# Patient Record
Sex: MALE | Race: WHITE
[De-identification: names, ages, dates, MRNs, and addresses within clinical notes are randomized per-mention and may not be internally consistent; named-entity substitution may affect disease eponyms.]

---

## 2018-01-16 NOTE — OP
DATE OF PROCEDURE:  01/16/2018

 

PREOPERATIVE DIAGNOSIS:  Benign prostatic hypertrophy with bladder stones.

 

POSTOPERATIVE DIAGNOSIS:  Benign prostatic hypertrophy with bladder stones.

 

PROCEDURE PERFORMED:  Cystolitholapaxy.

 

SURGEON:  Dr. Davenport.

 

ANESTHESIA:  General with LMA.

 

FINDINGS:  Adequate fragmentation of 2 bladder stones with all of the fragments 
evacuated.  A total of 2250 joules used.

 

COMPLICATIONS:  No complications.  

 

SPECIMEN:  Bladder Stone.

 

DRAINS:  Drain remaining was a 20-Slovak 2-way.

 

ESTIMATED BLOOD LOSS:  Minimal.

 

INDICATIONS:  A 57-year-old male who presented with gross hematuria and the 
workup revealed bladder stones as well as significant BPH.  Based on the size 
of his gland and the significant hematuria associated with the stones, I opted 
to do cystolitholapaxy with delayed GreenLight.  He presents for the first 
procedure.

 

TECHNIQUE:  The patient was brought to the room by Anesthesia and kept in 
supine position.  After receiving general anesthetic, his legs were placed in 
lithotomy position and his perineum was prepped and draped in sterile fashion.  
Using a 26 resectoscope, urethra was traversed and the bladder inspected.  
Hematuria was noted before doing any procedure, both around the cystoscope and 
in the bladder itself.  The stones were identified and then fragmented into 
pieces that were small enough to extract.  At one point, they had situated 
themselves in a small diverticulum, but I ensured to irrigate this out fully 
and carefully inspected the floor of the bladder, which meant removing the 
large obstructing middle lobe laterally and medially in order to determine all 
fragments were removed.  Once I was satisfied of this, I checked the 
diverticulum again and nothing was there.  So at this point, the resectoscope 
was removed and a 20 Slovak Eason was placed to gravity.  There was no 
significant hematuria around the catheter itself and I expect there to be 
significant hematuria over the next day or so.  The patient tolerated the 
procedure well and was then awakened and transferred to the PACU in stable 
condition.

 

EVA

## 2018-02-19 ENCOUNTER — HOSPITAL ENCOUNTER (OUTPATIENT)
Dept: HOSPITAL 92 - LABBT | Age: 58
Discharge: HOME | End: 2018-02-19
Attending: UROLOGY
Payer: COMMERCIAL

## 2018-02-19 DIAGNOSIS — N40.0: ICD-10-CM

## 2018-02-19 DIAGNOSIS — Z01.812: Primary | ICD-10-CM

## 2018-02-19 LAB
ANION GAP SERPL CALC-SCNC: 10 MMOL/L (ref 10–20)
BUN SERPL-MCNC: 21 MG/DL (ref 8.4–25.7)
CALCIUM SERPL-MCNC: 9.7 MG/DL (ref 7.8–10.44)
CHLORIDE SERPL-SCNC: 107 MMOL/L (ref 98–107)
CO2 SERPL-SCNC: 26 MMOL/L (ref 22–29)
CREAT CL PREDICTED SERPL C-G-VRATE: 0 ML/MIN (ref 70–130)
GLUCOSE SERPL-MCNC: 92 MG/DL (ref 70–105)
HGB BLD-MCNC: 14.5 G/DL (ref 14–18)
MCH RBC QN AUTO: 29.7 PG (ref 27–31)
MCV RBC AUTO: 90.8 FL (ref 80–94)
PLATELET # BLD AUTO: 254 THOU/UL (ref 130–400)
POTASSIUM SERPL-SCNC: 4.4 MMOL/L (ref 3.5–5.1)
RBC # BLD AUTO: 4.88 MILL/UL (ref 4.7–6.1)
SODIUM SERPL-SCNC: 139 MMOL/L (ref 136–145)
WBC # BLD AUTO: 5.4 THOU/UL (ref 4.8–10.8)

## 2018-02-19 PROCEDURE — 81001 URINALYSIS AUTO W/SCOPE: CPT

## 2018-02-19 PROCEDURE — 80048 BASIC METABOLIC PNL TOTAL CA: CPT

## 2018-02-19 PROCEDURE — 87086 URINE CULTURE/COLONY COUNT: CPT

## 2018-02-19 PROCEDURE — 85027 COMPLETE CBC AUTOMATED: CPT

## 2018-02-27 ENCOUNTER — HOSPITAL ENCOUNTER (OUTPATIENT)
Dept: HOSPITAL 92 - SDC | Age: 58
Discharge: HOME | End: 2018-02-27
Attending: UROLOGY
Payer: COMMERCIAL

## 2018-02-27 VITALS — BODY MASS INDEX: 24.6 KG/M2

## 2018-02-27 DIAGNOSIS — N40.1: Primary | ICD-10-CM

## 2018-02-27 DIAGNOSIS — R31.0: ICD-10-CM

## 2018-02-27 DIAGNOSIS — E78.5: ICD-10-CM

## 2018-02-27 DIAGNOSIS — R35.1: ICD-10-CM

## 2018-02-27 DIAGNOSIS — Z87.442: ICD-10-CM

## 2018-02-27 DIAGNOSIS — Z98.890: ICD-10-CM

## 2018-02-27 DIAGNOSIS — R31.29: ICD-10-CM

## 2018-02-27 DIAGNOSIS — R33.9: ICD-10-CM

## 2018-02-27 DIAGNOSIS — R35.0: ICD-10-CM

## 2018-02-27 DIAGNOSIS — Z79.899: ICD-10-CM

## 2018-02-27 DIAGNOSIS — R39.12: ICD-10-CM

## 2018-02-27 PROCEDURE — 88305 TISSUE EXAM BY PATHOLOGIST: CPT

## 2018-02-27 PROCEDURE — 0V507ZZ DESTRUCTION OF PROSTATE, VIA NATURAL OR ARTIFICIAL OPENING: ICD-10-PCS | Performed by: UROLOGY

## 2018-02-27 NOTE — OP
DATE OF SERVICE:  02/27/2018

 

PREOPERATIVE DIAGNOSES:  Benign prostatic hypertrophy, prior bladder stones.

 

POSTOPERATIVE DIAGNOSES:  Benign prostatic hypertrophy, prior bladder stones.

 

PROCEDURE:  GreenLight laser vaporization of the prostate with enucleation of the middle lobe.

 

SURGEON:  Dr. Judy Davenport

 

ANESTHESIA:  General with laryngeal mask airway.

 

FINDINGS:  Adequate resection of a large intravesical median lobe using 290,526 joules.  A good strea
m noted at the end of the case.  

 

COMPLICATIONS:  No complications.  

 

SPECIMEN:  Specimen was prostate as well as old clot from the prior procedure sent together.

 

DRAIN REMAINING:  A 20-Gibraltarian 2-way.  

 

ESTIMATED BLOOD LOSS:  Blood loss was minimal.

 

INDICATIONS:  The patient is a 57-year-old male who is followed in the office and noted to have bladd
er stones with significant BPH.  He had already undergone cystolitholapaxy 6 weeks prior, and present
s for definitive prostate therapy.

 

TECHNIQUE:  The patient was brought into the room by Anesthesia, placed on the table in supine positi
on.  After obtaining general anesthetic his legs were placed in lithotomy position and his perineum w
as prepped and draped in sterile fashion.  Using a 22.5 Gibraltarian scope and 30 degree lens it was fabrice
sed and the bladder inspected.  Ureteral orifices were difficult to find given the mobile intravesica
l middle lobe, but they were noted and preserved throughout the case.  There was also a diverticulum 
just lateral to the right UO had accumulated debris that was ensured to be cleaned by the end of the 
case.  Resection was carefully taken down over the middle lobe using a power of 80 for significant po
rtion of the beginning of the case given that this portion of resection was inside the bladder.  When
 the middle lobe was finally taken down to the level of the bladder floor and a power of 180 was used
.  The lateral lobes were also taken down without the need for enucleation and after approximately 25
0,000 joules adequate resection of the middle lobe and some of the lateral lobes had been performed a
nd the chips were evacuated out.  A grasper was used to get the chips out of the diverticulum.  Then,
 attention was turned back to the lateral lobes with the bladder left decompressed and there was furt
her resection to be had on this area.  Once this was completely opened up the scope was removed and a
 good stream was noted.  The scope was put back in, the bladder decompressed.  Hemostasis insured.  A
 power of 80 was used to paint a portion of the prostatic bed for hemostasis.  A total of 290,526 radha
les were used and when the scope was removed, a final time, a 20 Gibraltarian catheter was placed to gravit
y.  The patient tolerated procedure well and was then awakened and transferred to PACU in stable cond
ition.

## 2022-08-15 ENCOUNTER — HOSPITAL ENCOUNTER (OUTPATIENT)
Dept: HOSPITAL 92 - CSHULT | Age: 62
Discharge: HOME | End: 2022-08-15
Attending: FAMILY MEDICINE
Payer: COMMERCIAL

## 2022-08-15 DIAGNOSIS — N28.9: Primary | ICD-10-CM

## 2022-08-15 PROCEDURE — 76770 US EXAM ABDO BACK WALL COMP: CPT
